# Patient Record
Sex: MALE | Race: WHITE | NOT HISPANIC OR LATINO
[De-identification: names, ages, dates, MRNs, and addresses within clinical notes are randomized per-mention and may not be internally consistent; named-entity substitution may affect disease eponyms.]

---

## 2019-11-11 PROBLEM — Z00.00 ENCOUNTER FOR PREVENTIVE HEALTH EXAMINATION: Status: ACTIVE | Noted: 2019-11-11

## 2019-11-12 ENCOUNTER — APPOINTMENT (OUTPATIENT)
Dept: BURN CARE | Facility: CLINIC | Age: 59
End: 2019-11-12
Payer: OTHER MISCELLANEOUS

## 2019-11-12 ENCOUNTER — OUTPATIENT (OUTPATIENT)
Dept: OUTPATIENT SERVICES | Facility: HOSPITAL | Age: 59
LOS: 1 days | Discharge: HOME | End: 2019-11-12

## 2019-11-12 DIAGNOSIS — T30.0 BURN OF UNSPECIFIED BODY REGION, UNSPECIFIED DEGREE: ICD-10-CM

## 2019-11-12 DIAGNOSIS — Z86.39 PERSONAL HISTORY OF OTHER ENDOCRINE, NUTRITIONAL AND METABOLIC DISEASE: ICD-10-CM

## 2019-11-12 DIAGNOSIS — E03.9 HYPOTHYROIDISM, UNSPECIFIED: ICD-10-CM

## 2019-11-12 DIAGNOSIS — S01.80XA UNSPECIFIED OPEN WOUND OF OTHER PART OF HEAD, INITIAL ENCOUNTER: ICD-10-CM

## 2019-11-12 PROCEDURE — 99201 OFFICE OUTPATIENT NEW 10 MINUTES: CPT

## 2019-11-12 RX ORDER — LEVOTHYROXINE SODIUM 137 UG/1
TABLET ORAL
Refills: 0 | Status: ACTIVE | COMMUNITY

## 2019-11-13 PROBLEM — S01.80XA: Status: ACTIVE | Noted: 2019-11-13

## 2019-11-13 PROBLEM — T30.0 FRICTION BURN OF SKIN: Status: ACTIVE | Noted: 2019-11-13

## 2019-11-13 NOTE — HISTORY OF PRESENT ILLNESS
[Did this injury occur on the job?] : Did this injury occur on the job? Yes [Did you have an operation on your burn/wound injury?] : Did you have an operation on your burn/wound injury? No [de-identified] : 11/8/19 [de-identified] : Pt seen by PMD and  has been applying Bacitracin to wounds after washing as directed and notes moderate improvement.  [de-identified] : pt states he tripped at work and landed on face sustained abrasions to left side of face and lip.  [de-identified] : work

## 2019-11-13 NOTE — PHYSICAL EXAM
[Healing] : healing [Normal] : normal [Small] : small  [] : yes [de-identified] : Pink open wounds with patchy yellow areas left brow, nose, upper lip, cheek ; mild surrounding erythema   [TWNoteComboBox2] : Bacitracin [TWNoteComboBox1] : bandaid

## 2019-11-13 NOTE — ASSESSMENT
[FreeTextEntry1] : Healing friction wounds multiple sites of face \par \par Rec: continue Bacitracin ointment after washing with mild soap and water.  Pt prefers to f/u with PMD\par \par  [Wound Care] : wound care

## 2019-11-13 NOTE — REASON FOR VISIT
[Initial] : initial visit [Were you admitted to the burn center at [facility]?] : not admitted to the burn center at [facility] [FreeTextEntry3] : JON [Were you seen in the Emergency Room?] : not seen in the emergency room
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
## 2019-11-19 DIAGNOSIS — S01.80XA UNSPECIFIED OPEN WOUND OF OTHER PART OF HEAD, INITIAL ENCOUNTER: ICD-10-CM

## 2019-11-19 DIAGNOSIS — T30.0 BURN OF UNSPECIFIED BODY REGION, UNSPECIFIED DEGREE: ICD-10-CM
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
## 2021-08-26 ENCOUNTER — APPOINTMENT (OUTPATIENT)
Age: 61
End: 2021-08-26

## 2021-10-04 ENCOUNTER — APPOINTMENT (OUTPATIENT)
Age: 61
End: 2021-10-04
Payer: COMMERCIAL

## 2021-10-04 VITALS
BODY MASS INDEX: 30.51 KG/M2 | HEART RATE: 78 BPM | OXYGEN SATURATION: 97 % | SYSTOLIC BLOOD PRESSURE: 120 MMHG | RESPIRATION RATE: 14 BRPM | DIASTOLIC BLOOD PRESSURE: 78 MMHG | WEIGHT: 206 LBS | HEIGHT: 69 IN

## 2021-10-04 DIAGNOSIS — G47.33 OBSTRUCTIVE SLEEP APNEA (ADULT) (PEDIATRIC): ICD-10-CM

## 2021-10-04 PROCEDURE — 99203 OFFICE O/P NEW LOW 30 MIN: CPT

## 2021-10-04 NOTE — PHYSICAL EXAM
[No Acute Distress] : no acute distress [Normal Oropharynx] : normal oropharynx [Enlarged Base of the Tongue] : enlarged base of the tongue [IV] : Mallampati Class: IV [Normal Appearance] : normal appearance [No Neck Mass] : no neck mass [Normal Rate/Rhythm] : normal rate/rhythm [Normal S1, S2] : normal s1, s2 [No Murmurs] : no murmurs [No Resp Distress] : no resp distress [Clear to Auscultation Bilaterally] : clear to auscultation bilaterally [No Abnormalities] : no abnormalities [Benign] : benign [Normal Gait] : normal gait [No Clubbing] : no clubbing [No Cyanosis] : no cyanosis [No Edema] : no edema [FROM] : FROM [Normal Color/ Pigmentation] : normal color/ pigmentation [No Focal Deficits] : no focal deficits [Oriented x3] : oriented x3 [Normal Affect] : normal affect

## 2021-10-04 NOTE — ASSESSMENT
[FreeTextEntry1] : A: There is a high clinical suspicion for FERDINAND. \par  The patient has classic signs of obstructive sleep apnea.\par \par \par P:\par I will order attended split sleep testing and I will see the patient  in F/U to arrange for therapies as needed.\par \par

## 2021-10-04 NOTE — REASON FOR VISIT
[Sleep Evaluation] : sleep evaluation [TextBox_44] : The patient has been having a long history of restlessness at night tossing and turning.  His wife constantly pokes him to tell him to roll over due to due to his snoring.  He just started propping himself up onto pillows.  States this helps to some extent.  He awakens he is usually tolerable is able to get through the day.  If he does sit and relax he will doze off.  This has been a progressive disorder for some time.

## 2021-10-04 NOTE — HISTORY OF PRESENT ILLNESS
[Excessive Daytime Sleepiness] : excessive daytime sleepiness [Witnessed Apnea During Sleep] : witnessed apnea during sleep [Witnessed Gasping During Sleep] : witnessed gasping during sleep [Snoring] : snoring [Unrefreshing Sleep] : unrefreshing sleep [Sleepy When Sedentary] : sleepy when sedentary [Currently Experiencing] : The patient is currently experiencing symptoms. [Obstructive Sleep Apnea] : obstructive sleep apnea [TextBox_4] : Patient works as a .  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  Of note his brother is using his CPAP for sleep apnea.